# Patient Record
(demographics unavailable — no encounter records)

---

## 2025-01-14 NOTE — PHYSICAL EXAM
[No Acute Distress] : no acute distress [Well-Appearing] : well-appearing [No Lymphadenopathy] : no lymphadenopathy [Supple] : supple [Normal] : soft, non-tender, non-distended, no masses palpated, no HSM and normal bowel sounds [Normal Supraclavicular Nodes] : no supraclavicular lymphadenopathy [Normal Posterior Cervical Nodes] : no posterior cervical lymphadenopathy [Normal Anterior Cervical Nodes] : no anterior cervical lymphadenopathy [No Joint Swelling] : no joint swelling [Grossly Normal Strength/Tone] : grossly normal strength/tone [No Rash] : no rash [Coordination Grossly Intact] : coordination grossly intact [Normal Gait] : normal gait [Normal Affect] : the affect was normal [Normal Insight/Judgement] : insight and judgment were intact

## 2025-01-14 NOTE — HEALTH RISK ASSESSMENT
[Yes] : Yes [2 - 4 times a month (2 pts)] : 2-4 times a month (2 points) [1 or 2 (0 pts)] : 1 or 2 (0 points) [Never (0 pts)] : Never (0 points) [0] : 2) Feeling down, depressed, or hopeless: Not at all (0) [PHQ-2 Negative - No further assessment needed] : PHQ-2 Negative - No further assessment needed [Patient reported PAP Smear was normal] : Patient reported PAP Smear was normal [HIV test declined] : HIV test declined [Never] : Never [NO] : No

## 2025-01-14 NOTE — PLAN
[FreeTextEntry1] : #Bloating - Trial of eliminating gluten, then dairy, for 2 weeks each - Symptom diary - F/u PRN  #Fatigue - Check CBC, TFTs today - Continue working with therapist  #HM - Labs today

## 2025-01-14 NOTE — ASSESSMENT
[FreeTextEntry1] : #Bloating No associated N/V, abdominal pain or cramping, change or issue with BMs. Likely dietary. Pt will try cutting out gluten for 2 weeks to see if that helps symptoms. We also discussed cutting out dairy which she is more hesitant to do because of cheese. Discussed keeping a symptom diary to try to identify triggers.   #Fatigue Could be in the setting of work and personal stress as she was recently  and notes significant professional changes around the same time. We discussed ruling out anemia and thyroid disorder.  #HM UTD on flu, covid, tdap, and pap. Does not need STI screening today. Ok with labs.